# Patient Record
(demographics unavailable — no encounter records)

---

## 2018-01-01 NOTE — PN
[place], Progress Note





- [place] Exam


Weight: 9 lb 5.2 oz


Chest Circumference: 36.0


Head Circumference: 34.0


Vital Signs: 


 Vital Signs











Temperature  98.0 F   18 08:09


 


Pulse Rate  132   18 21:19


 


Respiratory Rate  40   18 21:19


 


Blood Pressure  54/32   18 09:15


 


O2 Sat by Pulse Oximetry (%)      











General Appearance: Yes: No Abnormalities


Skin: Yes: No Abnormalities, Dry, Other (few hyperpigmented macules to chin)


Head: Yes: No Abnormalities, Molding, Caput (reduced from 2/3/18 exam)


Eyes: Yes: No Abnormalities


Ears: Yes: No Abnormalities


Nose: Yes: No Abnormalities


Mouth: Yes: No Abnormalities


Chest: Yes: No Abnormalities, Symmetrical


Lungs/Respiratory: Yes: No Abnormalities, Bilateral good air entry


Cardiac: Yes: No Abnormalities, S1, S2


Abdomen: Yes: No Abnormalities, Umb Ves, 2 artery 1 vein


Gastrointestinal: Yes: No Abnormalities


Genitalia: No Abnormalities


Anus: Yes: No Abnormalities


Extremities: Yes: No Abnormalities


Collazo Test: Negative


Ortolani Test: Negative


Femoral Pulse: Strong


Spine: Yes: No Abnormalities


Reflexes: Lauro: Present, Rooting: Present, Sucking: Present


Neuro: Yes: No Abnormalities, Alert


Cry: No Abnormalities, Strong





- Other Data/Findings


Labs, Other Data: 


 Intake





Intake, Oral Amount              60


Intake, Oral Amount              60


Intake, Oral Amount              55


Intake, Oral Amount              45


Intake, Oral Amount              60


Intake, Oral Amount              40


Intake, Oral Amount              30


Intake, Oral Amount              60





 Output





Number of Voids                  1


Number of Voids                  2


Number of Voids                  1


Stool Size                       Large


Stool Size                       Small


Stool Size                       Moderate


Stool Size                       Moderate


Stool Size                       Large


Stool Size                       Moderate


 Stool Description        Green,Soft


[place] Stool Description        Green,Soft


 Stool Description        Transistional,Pasty


 Stool Description        Transistional,Pasty


[place] Stool Description        Transistional,Pasty


 Stool Description        Meconium,Pasty





 Baby's Blood Type, [place]











Cord Blood Type  O POSITIVE   18  21:51    


 


LILLIAN, Poly Interpret  Negative  (NEGATIVE)   18  21:51    











Other Findings/Remarks: 


2 day female born by primary  due to non-reassuring fetal HR to a 32 

yr old  blood type O+ mother GBS status neg.  Caput and molding, improving. 

Admission BGM was 20. Subsequent BGMs were as follows:65-46-63-50-54 as noted 

below.  Breast and bottle, feeding well. Routine care. F/U at [facility], [address] [place] [address], [address], Phone: [phone] / [address] [facility], 

[address], Phone: [phone] upon discharge.











Medications








Discontinued Medications





Hepatitis B Vaccine (Engerix-B 10 Mcg/0.5 Ml *Pediatric* -)  10 mcg IM .ONCE ONE


   Stop: 18 23:46


   Last Admin: 18 00:35 Dose:  10 mcg





 Laboratory Tests











  18





  22:42 23:49 00:43


 


POC Glucometer  < 50  52.99839  < 50














  18





  05:11 13:50


 


POC Glucometer  50.60186  54.46001

## 2018-01-01 NOTE — HP
- Maternal History


Mother's Age: 32


 Status: 


Mother's Blood Type: O+


HBSAG: Negative


Date: 17


RPR: Negative


Date: 10/28/17


Group B Strep: Negative


HIV: Negative





- Maternal Risks


OB Risks: 1 SAB





[place] Data





- Admission


Date of Admission: 18


Admission Time: 21:19


Date of Delivery: 18


Time of Delivery: 21:08


Wks Gestation by Dates: 39.4


Wks Gestation by Sono: 39.5


Infant Gender: Female


Type of Delivery: Primary C/S


Reason for C Section: FTP; NRFHR


Apgar Score @1 Minute: 9


Apgar score @ 5 Minutes: 9


Birth Weight: 9 lb 5.6 oz


Birth Length: 20 in


Head Circumference, Admission: 34.0


Chest Circumference: 36.0


Abdominal Girth: 34.0





- Vital Signs


  ** Left Upper Arm


Blood Pressure: 54/32


Blood Pressure Mean: 39





  ** Left Calf


Blood Pressure: 54/25


Blood Pressure Mean: 34





  ** Right Upper Arm


Blood Pressure: 59/26


Blood Pressure Mean: 37





  ** Right Calf


Blood Pressure: 58/25


Blood Pressure Mean: 36





- Labs


Labs: 


 Baby's Blood Type, [place]











Cord Blood Type  O POSITIVE   18  21:51    


 


LILLIAN, Poly Interpret  Negative  (NEGATIVE)   18  21:51    














 Infant, Physical Exam





-  Infant, Admission Exam


Birth Weight: 9 lb 5.6 oz


Birth Length: 20 in


Chest Circumference: 36.0


Initial Vital Signs: 


 Initial Vital Signs











Temp Pulse Resp


 


 98.6 F   132   40 


 


 18 21:19  18 21:19  18 21:19











General Appearance: Yes: No Abnormalities


Skin: Yes: No Abnormalities, Dry, Other (few hyperpigmented macules to chin)


Head: Yes: No Abnormalities, Molding, Caput


Eyes: Yes: No Abnormalities


Ears: Yes: No Abnormalities


Nose: Yes: No Abnormalities


Mouth: Yes: No Abnormalities


Chest: Yes: No Abnormalities


Lungs/Respiratory: Yes: No Abnormalities


Cardiac: Yes: No Abnormalities


Abdomen: Yes: No Abnormalities


Gastrointestinal: Yes: No Abnormalities


Genitalia: No Abnormalities


Anus: Yes: No Abnormalities


Extremities: Yes: No Abnormalities, Other (overlapping 4th left toe)


Clavicles: No abnormalities


Femoral Pulse: Strong


Ortolani Test: Negative


Collazo Test: Negative


Spine: Yes: No Abnormalities


Reflexes: Lauro: Present, Rooting: Present, Sucking: Present


Neuro: Yes: No Abnormalities





- Other Findings/Remarks


Other Findings/Remarks: 


1 day female born by primary  due to non-reassuring fetal HR to a 32 

yr old  blood type O+ mother GBS status neg. Breast and bottle. Caput and 

molding. Admission BGM was 20. Subsequent BGMs were as follows:40-52-47-50. 

Repeat BGM after feed.  Routine care. F/U at [facility], [address] [place] [address], Phone: [phone] / [address] [facility], [facility] [address], Phone: [phone] upon discharge.











Medications








Discontinued Medications





Hepatitis B Vaccine (Engerix-B 10 Mcg/0.5 Ml *Pediatric* -)  10 mcg IM .ONCE ONE


   Stop: 18 23:46


   Last Admin: 18 00:35 Dose:  10 mcg

## 2018-01-01 NOTE — CONSULT
- Maternal History


Mother's Age: 32


 Status: 


Mother's Blood Type: O+


HBSAG: Negative


Date: 17


RPR: Negative


Date: 10/28/17


Group B Strep: Negative


HIV: Negative





- Maternal Risks


OB Risks: 1 SAB





[place] Data





- Admission


Date of Admission: 18


Admission Time: 21:19


Date of Delivery: 18


Time of Delivery: 21:08


Wks Gestation by Dates: 39.4


Wks Gestation by Sono: 39.5


Infant Gender: Female


Type of Delivery: Primary C/S


Reason for C Section: FTP; NRFHR


Apgar Score @1 Minute: 9


Apgar score @ 5 Minutes: 9


Birth Weight: 4.241 kg


Birth Length: 50.8 cm


Head Circumference, Admission: 34.0


Chest Circumference: 36.0


Abdominal Girth: 34.0





- Vital Signs


  ** Left Upper Arm


Blood Pressure: 54/32


Blood Pressure Mean: 39





  ** Left Calf


Blood Pressure: 54/25


Blood Pressure Mean: 34





  ** Right Upper Arm


Blood Pressure: 59/26


Blood Pressure Mean: 37





  ** Right Calf


Blood Pressure: 58/25


Blood Pressure Mean: 36





- Labs


Labs: 


 Baby's Blood Type, [place]aine











Cord Blood Type  O POSITIVE   18  21:51    


 


LILLIAN, Poly Interpret  Negative  (NEGATIVE)   18  21:51    














Level 2, History and Physical


[place] History: 





Ex 39 weeker, born via Csection for NRFHT and failure to progress. Negative 

prenatal labs. ROM 20 h PTD. Baby was vigorous at birth, with good tone and 

good respiratory efforts. Baby was dried and stimulated . Apgars 9. and 9 at 1 

and 5 min of life. Baby received routine care in the OR. 





-  Infant


Birth Weight: 4.241 kg


Birth Length: 50.8 cm


Vital Signs: 


 Vital Signs











Temperature  36.9 C   18 09:04


 


Pulse Rate  132   18 21:19


 


Respiratory Rate  40   18 21:19


 


Blood Pressure  54/32   18 09:06


 


O2 Sat by Pulse Oximetry (%)      











Chest Circumference: 36.0


General Appearance: Yes: No Abnormalities, Pink


Skin: Yes: No Abnormalities


Head: Yes: No Abnormalities, Molding


Eyes: Yes: No Abnormalities


Ears: Yes: No Abnormalities


Nose: Yes: No Abnormalities


Mouth: Yes: No Abnormalities


Chest: Yes: No Abnormalities, Symmetrical


Lungs/Respiratory: Yes: No Abnormalities, Bilateral good air entry


Cardiac: Yes: No Abnormalities, S1, S2


Abdomen: Yes: No Abnormalities, Umb Ves, 2 artery 1 vein


Gastrointestinal: Yes: No Abnormalities


Genitalia: No Abnormalities


Extremities: Yes: No Abnormalities


Spine: Yes: No Abnormalities


Reflexes: [place]: Present


Neuro: Yes: No Abnormalities, Alert


Cry: Yes: No Abnormalities, Strong





Problem List





- Problems


(1) 


Code(s): Z38.2 - SINGLE LIVEBORN INFANT, UNSPECIFIED AS TO PLACE OF BIRTH   





(2) LGA (large for gestational age) infant


Code(s): P08.1 - OTHER HEAVY FOR GESTATIONAL AGE    





Assessment/Plan





Ex  39 weeker, LGA male born via Csection for [facility]. Apgars 9/9. Prenatal labs 

negative. Recommend blood glucose monitoring as per protocol.

## 2018-01-01 NOTE — DS
- Maternal History


Mother's Age: 32


 Status: 


Mother's Blood Type: O+


HBSAG: Negative


Date: 17


RPR: Negative


Date: 10/28/17


Group B Strep: Negative


HIV: Negative





- Maternal Risks


OB Risks: 1 SAB





 Data





- Admission


Date of Admission: 18


Admission Time: 21:19


Date of Delivery: 18


Time of Delivery: 21:08


Wks Gestation by Dates: 39.4


Wks Gestation by Sono: 39.5


Infant Gender: Female


Type of Delivery: Primary C/S


Reason for C Section: FTP; NRFHR


Apgar Score @1 Minute: 9


Apgar score @ 5 Minutes: 9


Birth Weight: 9 lb 5.6 oz


Birth Length: 20 in


Head Circumference, Admission: 34.0


Chest Circumference: 36.0


Abdominal Girth: 34.0





- Vital Signs


  ** Left Upper Arm


Blood Pressure: 54/32


Blood Pressure Mean: 39





  ** Left Calf


Blood Pressure: 54/25


Blood Pressure Mean: 34





  ** Right Upper Arm


Blood Pressure: 59/26


Blood Pressure Mean: 37





  ** Right Calf


Blood Pressure: 58/25


Blood Pressure Mean: 36





- Hearing Screen


Left Ear: Passed


Right Ear: Passed


Hearing Screen Complete: 18





- Labs


Labs: 


 Baby's Blood Type, Loraine











Cord Blood Type  O POSITIVE   18  21:51    


 


LILLIAN, Poly Interpret  Negative  (NEGATIVE)   18  21:51    














- [facility] Screening


[place] Screening Card Number: [id number]





 PE, Discharge





- Physical Exam


Last Weight Documented: 9 lb 4.2 oz


Vital Signs: 


 Vital Signs











Temperature  98.4 F   18 07:20


 


Pulse Rate  132   18 21:19


 


Respiratory Rate  40   18 21:19


 


Blood Pressure  54/32   18 09:15


 


O2 Sat by Pulse Oximetry (%)      








 SpO2





Preductal SpO2, Right Arm        98


Postductal SpO2 [Right Leg]      99








General Appearance: Yes: No Abnormalities


Skin: Yes: No Abnormalities, Dry, Other (few hyperpigmented macules to chin)


Head: Yes: No Abnormalities, Molding, Caput (reduced from 2/3/18 exam)


Eyes: Yes: No Abnormalities


Ears: Yes: No Abnormalities


Nose: Yes: No Abnormalities


Mouth: Yes: No Abnormalities


Chest: Yes: No Abnormalities, Symmetrical


Lungs/Respiratory: Yes: No Abnormalities, Bilateral good air entry


Cardiac: Yes: No Abnormalities, S1, S2


Abdomen: Yes: No Abnormalities, Umb Ves, 2 artery 1 vein


Gastrointestinal: Yes: No Abnormalities


Genitalia: No Abnormalities


Anus: Yes: No Abnormalities


Extremities: Yes: No Abnormalities


Spine: Yes: No Abnormalities


Reflexes: [place]: Present, Rooting: Present, Sucking: Present


Neuro: Yes: No Abnormalities, Alert


Cry: Yes: No Abnormalities, Strong


Preductal SpO2, Right Arm: 98


  ** Right Leg


Postductal SpO2: 99


Other Findings/Remarks: 


3 day female born by primary  due to non-reassuring fetal HR to a 32 

yr old  blood type O+ mother GBS status neg.  Caput and molding, improving. 

Admission BGM was 20. Subsequent BGMs were as follows:71-38-84-50-54 as noted 

below.  Breast and bottle, feeding well. Routine care. F/U at [facility], [address] [place] [address], [address], Phone: [phone] on Thursday, 18 

at 9:30 am. 


 Laboratory Tests











  18





  07:40


 


Total Bilirubin  5.4 L


 


Direct Bilirubin  0.2














Medications








Discontinued Medications





Hepatitis B Vaccine (Engerix-B 10 Mcg/0.5 Ml *Pediatric* -)  10 mcg IM .ONCE ONE


   Stop: 18 23:46


   Last Admin: 18 00:35 Dose:  10 mcg





 Laboratory Tests











  18





  22:42 23:49 00:43


 


POC Glucometer  < 50  52.09034  < 50














  18





  05:11 13:50


 


POC Glucometer  50.89184  54.01589




















Discharge Summary


Reason For Visit: 


Current Active Problems





LGA (large for gestational age) infant (Acute)


[place] (Acute)








Condition: Good





- Instructions


Referrals: 


Salo Brown MD [Staff Physician] -  (Follow up [facility], 

[address] [place] [address], [address] on Thursday, 18 at 9:30 am. [phone].  )


Disposition: HOME